# Patient Record
Sex: MALE | Race: WHITE
[De-identification: names, ages, dates, MRNs, and addresses within clinical notes are randomized per-mention and may not be internally consistent; named-entity substitution may affect disease eponyms.]

---

## 2020-07-01 ENCOUNTER — HOSPITAL ENCOUNTER (OUTPATIENT)
Dept: HOSPITAL 41 - JD.SDS | Age: 25
Discharge: HOME | End: 2020-07-01
Attending: SURGERY
Payer: COMMERCIAL

## 2020-07-01 DIAGNOSIS — Z11.59: ICD-10-CM

## 2020-07-01 DIAGNOSIS — K21.9: ICD-10-CM

## 2020-07-01 DIAGNOSIS — K81.1: Primary | ICD-10-CM

## 2020-07-01 DIAGNOSIS — E66.9: ICD-10-CM

## 2020-07-01 DIAGNOSIS — Z79.899: ICD-10-CM

## 2020-07-01 PROCEDURE — 47562 LAPAROSCOPIC CHOLECYSTECTOMY: CPT

## 2020-07-01 PROCEDURE — U0002 COVID-19 LAB TEST NON-CDC: HCPCS

## 2020-07-01 PROCEDURE — 87635 SARS-COV-2 COVID-19 AMP PRB: CPT

## 2020-07-01 NOTE — PCM.PREANE
Preanesthetic Assessment





- Anesthesia/Transfusion/Family Hx


Anesthesia History: No Prior Anesthesia


Family History of Anesthesia Reaction: No


Transfusion History: No Prior Transfusion(s)





- Review of Systems


General: No Symptoms


Pulmonary: No Symptoms


Cardiovascular: No Symptoms


Gastrointestinal: Abdominal Pain (3/10)


Neurological: No Symptoms


Other: Reports: None





- Physical Assessment


NPO Status Date: 06/30/20


NPO Status Time: 20:00


ASA Class: 2


Mental Status: Alert & Oriented x3


Airway Class: Mallampati = 3


Dentition: Reports: Normal Dentition


Thyro-Mental Finger Breadths: 3


Mouth Opening Finger Breadths: 2


ROM/Head Extension: Full


Lungs: Clear to Auscultation, Normal Respiratory Effort


Cardiovascular: Regular Rate, Regular Rhythm





- Lab


Values: 





                             Laboratory Last Values











COVID-19 PCR  Not detected  (NOT DETECT)   06/29/20  12:30    














- Allergies


Allergies/Adverse Reactions: 


                                    Allergies











Allergy/AdvReac Type Severity Reaction Status Date / Time


 


No Known Allergies Allergy   Verified 06/30/20 13:26














- Acknowledgements


Anesthesia Type Planned: General Anesthesia


Pt an Appropriate Candidate for the Planned Anesthesia: Yes


Alternatives and Risks of Anesthesia Discussed w Pt/Guardian: Yes


Pt/Guardian Understands and Agrees with Anesthesia Plan: Yes





PreAnesthesia Questionnaire


HEENT History: Reports: Impaired Vision, Other (See Below)


Other HEENT History: WEARS GLASSES


Cardiovascular History: Reports: None, SOB on Exertion


Respiratory History: Reports: Sleep Apnea (undiagnosed)


Gastrointestinal History: Reports: GERD


Genitourinary History: Reports: None


OB/GYN History: Reports: None


Musculoskeletal History: Reports: None


Neurological History: Reports: None


Psychiatric History: Reports: None


Endocrine/Metabolic History: Reports: Obesity/BMI 30+


Hematologic History: Reports: None


Immunologic History: Reports: None


Oncologic (Cancer) History: Reports: None


Dermatologic History: Reports: None





- Past Surgical History


Head Surgeries/Procedures: Reports: None


Cardiovascular Surgical History: Reports: None


Respiratory Surgical History: Reports: None


GI Surgical History: Reports: None


Female  Surgical History: Reports: None


Male  Surgical History: Reports: None


Endocrine Surgical History: Reports: None


Neurological Surgical History: Reports: None


Musculoskeletal Surgical History: Reports: None


Oncologic Surgical History: Reports: None


Dermatological Surgical History: Reports: None





- SUBSTANCE USE


Smoking Status *Q: Never Smoker


Recreational Drug Use History: No





- HOME MEDS


Home Medications: 


                                    Home Meds





Ibuprofen [Advil] 200 - 400 mg PO BID PRN 06/30/20 [History]


Multivitamin [Daily Multiple Vitamin] 1 tab PO DAILY 06/30/20 [History]











- CURRENT (IN HOUSE) MEDS


Current Meds: 





                               Current Medications





Lactated Ringer's (Ringers, Lactated)  1,000 mls @ 125 mls/hr IV ASDIRECTED FAZAL


   Stop: 07/01/20 23:00


Lidocaine/Sodium Bicarbonate (Buffered Lidocaine 1% In Ns 8.4%)  0.25 ml IDERM 

ONETIME PRN


   PRN Reason: Prior to IV Start


   Stop: 07/01/20 18:00


Sodium Chloride (Saline Flush)  10 ml FLUSH ASDIRECTED PRN


   PRN Reason: Keep Vein Open


   Stop: 07/01/20 18:00





Discontinued Medications





Cefazolin Sodium (Ancef) Confirm Administered Dose 3 gm .ROUTE .STK-MED ONE


   Stop: 07/01/20 06:57


Fentanyl (Sublimaze) Confirm Administered Dose 250 mcg .ROUTE .STK-MED ONE


   Stop: 07/01/20 06:56


Lidocaine HCl (Xylocaine-Mpf 1%) Confirm Administered Dose 4 mls @ as directed 

.ROUTE .STK-MED ONE


   Stop: 07/01/20 06:55


Midazolam HCl (Versed 1 Mg/Ml) Confirm Administered Dose 2 mg .ROUTE .STK-MED 

ONE


   Stop: 07/01/20 06:56


Propofol (Diprivan  20 Ml) Confirm Administered Dose 200 mg .ROUTE .STK-MED ONE


   Stop: 07/01/20 06:55


Rocuronium Bromide (Zemuron) Confirm Administered Dose 50 mg .ROUTE .STK-MED ONE


   Stop: 07/01/20 06:55

## 2020-07-01 NOTE — [id number]
Post Anesthesia Note





- EVALUATION WITHIN 48HRS OF ANESTHETIC


Vital Signs in Normal Range: Yes


Patient Participated in Evaluation: Yes


Respiratory Function Stable: Yes


Airway Patent: Yes


Cardiovascular Function Stable: Yes


Hydration Status Stable: Yes


Pain Control Satisfactory: Yes


Nausea and Vomiting Control Satisfactory: Yes


Mental Status Recovered: Yes


Vital Signs: 


                                Last Vital Signs











Temp  37.1 C   07/01/20 09:37


 


Pulse  77   07/01/20 07:10


 


Resp  14   07/01/20 10:00


 


BP  137/75   07/01/20 10:00


 


Pulse Ox  92 L  07/01/20 10:00

## 2020-07-01 NOTE — PCM.PRNOTE
- Free Text/Narrative


Note: 





OPERATIVE REPORT








Date of Surgery/Procedure: July 1, 2020


Operative Procedure(s): laparoscopic cholecystectomy





Findings: Normal gallbladder anatomy


Pre Op Diagnosis: Biliary dyskinesia


Post-Op Diagnosis: Same





Anesthesia Technique: General ET Tube


Primary Surgeon: Kemi Russell MD


Anesthesia Provider: Nayeli Chaudhari CRNA





Pathology: Gallbladder with stones





Fluid Replacement, Intraop: 1800cc  


Output, Urine Amount: 0cc


EBL: 5cc





Drain/Tube Comments: none





Indication for the procedure: The patient is a 24-year-old gentleman who 

presented to my office with biliary colic symptoms and imaging consistent with 

Biliary dyskinesia. The patient was counseled for laparoscopic cholecystectomy, 

with possible conversion to open.  After discussion of the risks of infection, 

bleeding and injury to the bile duct as well as increased complication from 

previous intra-abdominal surgery, the patient's consent was obtained.





Description of the procedure: The patient presented to the outpatient holding 

area on the day of the procedure.  The history and physical were verified and 

consent was present and on the chart.  The patient was taken back to the 

operating room and placed in supine position on the operating table.  SCD boots 

were placed and functional prior to the start of the procedure.  Preoperative 

antibiotics were administered according to SCIP protocol, Ancef 3 g IV.  A 

surgical timeout was performed. The patient then had induction of general 

anesthesia and was intubated without difficulty.  The patient was prepped and 

draped in standard surgical fashion.





We began by making an infraumbilical vertical incision and deepened this down 

through subcutaneous fat to the level of the fascia.  This was grasped and 

incised.  We bluntly entered through the peritoneum and a finger sweep was done.

 A stay suture of 0 Vicryl was placed in the fascia.  The 12 mm balloon Hernandez 

port was then inserted into the abdomen and the balloon inflated.  Insufflation 

was attached and we had appropriate opening pressures.  The abdomen was then 

insufflated to 15 mmHg.  We inserted a scope into the abdomen and inspected the 

area where we had entered.  There was no evidence of injury to surrounding 

structures with no evidence of bile or bleeding.  A TAP block was then performed

using 1% lidocaine with epinephrine mixed with 0.5% bupivacaine with 

epinephrine. 





We then proceeded with placing our additional ports.  A 5mm port was placed in 

the epigastric region.  Two additional 5mm ports placed under direct visu

alization in the right upper quadrant.  The patient was then positioned with 

head up and right side up to facilitate exposure of the gallbladder. Once we had

sufficiently exposed the dome of the gallbladder, this was grasped and retracted

cephalad. We proceeded with our dissection to expose the cystic duct and cystic 

artery.  We did have a critical view.  The cystic duct and artery were then 

clipped and cut using endoscopic scissors.  We then proceeded to fully dissect 

the gallbladder off of the cystic plate using the Bovie device.  The gallbladder

was then placed in the Endo Catch bag and withdrawn towards the umbilical port.





We then inspected the area of the dissection. There was no significant bleeding.

The spilled bile was removed using RayTecs which were then promptly removed from

the abdomen.  We then inspected the port sites and desufflated the abdomen.  The

ports were then removed.  The gallbladder was withdrawn through the umbilical 

port site.  We then proceeded to close the umbilical port site using an 0 Vicryl

stitch.  We had good closure of the fascia.  A superficial 4-0 monocryl suture 

was used to approximate the skin.  The skin was covered with Dermabond surgical 

glue. 





The patient tolerated the procedure well and was extubated without difficulty.  

He was transported to the PACU in stable condition.





All sponge, needle counts correct.  I was scrubbed and actively participated in 

the entire procedure.  No immediate complications noted. 





Complications: None apparent


Condition: Good





Kemi Russell MD


General Surgery

## 2020-07-01 NOTE — PCM.OPNOTE
- General Post-Op/Procedure Note


Date of Surgery/Procedure: 07/01/20


Operative Procedure(s): laparoscopic cholecystectomy


Findings: 





normal anatomy


Pre Op Diagnosis: biliary dyskinesia


Post-Op Diagnosis: same


Anesthesia Technique: General ET Tube


Primary Surgeon: Kemi Russell


Anesthesia Provider: Nayeli Chaudhari


Pathology: 





gallbladder with contents


Fluid Replacement, Intraop: 1,800


Output, Urine Amount: 0


EBL in mLs: 5


Complications: None apparent


Condition: Good

## 2020-07-01 NOTE — PCM.POSTAN
POST ANESTHESIA ASSESSMENT





- MENTAL STATUS


Mental Status: Alert, Oriented





- VITAL SIGNS


Vital Signs: 


                                Last Vital Signs











Temp  37.1 C   07/01/20 09:37


 


Pulse  77   07/01/20 07:10


 


Resp  11 L  07/01/20 09:37


 


BP  156/88 H  07/01/20 09:37


 


Pulse Ox  100   07/01/20 09:37














- RESPIRATORY


Respiratory Status: Respiratory Rate WNL, Airway Patent, O2 Saturation Stable





- CARDIOVASCULAR


CV Status: Pulse Rate WNL, Blood Pressure Stable





- GASTROINTESTINAL


GI Status: No Symptoms





- PAIN


Pain Score: 2





- POST OP HYDRATION


Hydration Status: Adequate & Stable